# Patient Record
Sex: FEMALE | Race: BLACK OR AFRICAN AMERICAN | NOT HISPANIC OR LATINO | URBAN - METROPOLITAN AREA
[De-identification: names, ages, dates, MRNs, and addresses within clinical notes are randomized per-mention and may not be internally consistent; named-entity substitution may affect disease eponyms.]

---

## 2017-01-22 ENCOUNTER — EMERGENCY (EMERGENCY)
Facility: HOSPITAL | Age: 22
LOS: 0 days | Discharge: HOME | End: 2017-01-22
Attending: EMERGENCY MEDICINE
Payer: COMMERCIAL

## 2017-01-22 VITALS
SYSTOLIC BLOOD PRESSURE: 118 MMHG | RESPIRATION RATE: 18 BRPM | TEMPERATURE: 99 F | HEART RATE: 79 BPM | HEIGHT: 64 IN | OXYGEN SATURATION: 100 % | DIASTOLIC BLOOD PRESSURE: 59 MMHG | WEIGHT: 128.09 LBS

## 2017-01-22 DIAGNOSIS — T78.49XA OTHER ALLERGY, INITIAL ENCOUNTER: ICD-10-CM
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
## 2017-01-22 DIAGNOSIS — T78.40XA ALLERGY, UNSPECIFIED, INITIAL ENCOUNTER: ICD-10-CM

## 2017-01-22 DIAGNOSIS — R21 RASH AND OTHER NONSPECIFIC SKIN ERUPTION: ICD-10-CM

## 2017-01-22 PROCEDURE — 99284 EMERGENCY DEPT VISIT MOD MDM: CPT

## 2017-01-22 RX ORDER — HYDROXYZINE HCL 10 MG
1 TABLET ORAL
Qty: 16 | Refills: 0
Start: 2017-01-22 | End: 2017-01-26

## 2017-01-22 RX ORDER — DIPHENHYDRAMINE HCL 50 MG
25 CAPSULE ORAL ONCE
Qty: 0 | Refills: 0 | Status: COMPLETED | OUTPATIENT
Start: 2017-01-22 | End: 2017-01-22

## 2017-01-22 RX ADMIN — Medication 60 MILLIGRAM(S): at 19:25

## 2017-01-22 RX ADMIN — Medication 25 MILLIGRAM(S): at 19:25

## 2017-01-22 NOTE — ED ADULT NURSE NOTE - OBJECTIVE STATEMENT
AAO X4 , ACCOMPANIED BY MOTHER C/O rash to entire body , itching , hives , stinging sensation , redness ,

## 2017-01-22 NOTE — ED ADULT NURSE NOTE - CAS TRG GENERAL NORM CIRC DET
Bounding peripheral pulses/No visible significant external bleeding/Capillary refill less/equal to 2 seconds/Strong peripheral pulses

## 2018-06-15 ENCOUNTER — TRANSCRIPTION ENCOUNTER (OUTPATIENT)
Age: 23
End: 2018-06-15

## 2019-12-10 ENCOUNTER — EMERGENCY (EMERGENCY)
Facility: HOSPITAL | Age: 24
LOS: 1 days | Discharge: STILL ADMITTED OR ADMINISTRATIVE | End: 2019-12-10
Admitting: EMERGENCY MEDICINE

## 2019-12-10 ENCOUNTER — EMERGENCY (EMERGENCY)
Facility: HOSPITAL | Age: 24
LOS: 0 days | Discharge: ANOTHER HOSPITAL | End: 2019-12-10
Attending: EMERGENCY MEDICINE
Payer: MEDICAID

## 2019-12-10 ENCOUNTER — OUTPATIENT (OUTPATIENT)
Dept: INPATIENT UNIT | Facility: HOSPITAL | Age: 24
LOS: 1 days | Discharge: HOME | End: 2019-12-10
Payer: MEDICAID

## 2019-12-10 VITALS
TEMPERATURE: 98 F | DIASTOLIC BLOOD PRESSURE: 62 MMHG | OXYGEN SATURATION: 100 % | HEART RATE: 75 BPM | SYSTOLIC BLOOD PRESSURE: 123 MMHG | RESPIRATION RATE: 18 BRPM

## 2019-12-10 VITALS
RESPIRATION RATE: 14 BRPM | HEART RATE: 74 BPM | SYSTOLIC BLOOD PRESSURE: 99 MMHG | TEMPERATURE: 99 F | DIASTOLIC BLOOD PRESSURE: 54 MMHG

## 2019-12-10 VITALS
HEART RATE: 106 BPM | OXYGEN SATURATION: 100 % | DIASTOLIC BLOOD PRESSURE: 54 MMHG | TEMPERATURE: 98 F | SYSTOLIC BLOOD PRESSURE: 123 MMHG | HEIGHT: 64 IN | RESPIRATION RATE: 22 BRPM

## 2019-12-10 VITALS
SYSTOLIC BLOOD PRESSURE: 156 MMHG | DIASTOLIC BLOOD PRESSURE: 106 MMHG | WEIGHT: 147.93 LBS | RESPIRATION RATE: 18 BRPM | TEMPERATURE: 98 F | HEIGHT: 64 IN | OXYGEN SATURATION: 100 % | HEART RATE: 88 BPM

## 2019-12-10 VITALS — DIASTOLIC BLOOD PRESSURE: 62 MMHG | SYSTOLIC BLOOD PRESSURE: 111 MMHG

## 2019-12-10 DIAGNOSIS — Z3A.00 WEEKS OF GESTATION OF PREGNANCY NOT SPECIFIED: ICD-10-CM

## 2019-12-10 DIAGNOSIS — R10.9 UNSPECIFIED ABDOMINAL PAIN: ICD-10-CM

## 2019-12-10 DIAGNOSIS — Z3A.23 23 WEEKS GESTATION OF PREGNANCY: ICD-10-CM

## 2019-12-10 DIAGNOSIS — O26.899 OTHER SPECIFIED PREGNANCY RELATED CONDITIONS, UNSPECIFIED TRIMESTER: ICD-10-CM

## 2019-12-10 LAB
APPEARANCE UR: CLEAR — SIGNIFICANT CHANGE UP
BILIRUB UR-MCNC: NEGATIVE — SIGNIFICANT CHANGE UP
BLOOD UR QL VISUAL: NEGATIVE — SIGNIFICANT CHANGE UP
COLOR SPEC: COLORLESS — SIGNIFICANT CHANGE UP
GLUCOSE UR-MCNC: NEGATIVE — SIGNIFICANT CHANGE UP
KETONES UR-MCNC: SIGNIFICANT CHANGE UP
LEUKOCYTE ESTERASE UR-ACNC: NEGATIVE — SIGNIFICANT CHANGE UP
NITRITE UR-MCNC: NEGATIVE — SIGNIFICANT CHANGE UP
PH UR: 8 — SIGNIFICANT CHANGE UP (ref 5–8)
PROT UR-MCNC: NEGATIVE — SIGNIFICANT CHANGE UP
SP GR SPEC: 1.01 — SIGNIFICANT CHANGE UP (ref 1–1.04)
UROBILINOGEN FLD QL: NORMAL — SIGNIFICANT CHANGE UP

## 2019-12-10 PROCEDURE — 99285 EMERGENCY DEPT VISIT HI MDM: CPT

## 2019-12-10 PROCEDURE — 76817 TRANSVAGINAL US OBSTETRIC: CPT | Mod: 26

## 2019-12-10 PROCEDURE — 99205 OFFICE O/P NEW HI 60 MIN: CPT | Mod: 25

## 2019-12-10 PROCEDURE — 76815 OB US LIMITED FETUS(S): CPT | Mod: 26

## 2019-12-10 RX ORDER — ALBUTEROL 90 UG/1
0 AEROSOL, METERED ORAL
Qty: 0 | Refills: 0 | DISCHARGE

## 2019-12-10 NOTE — ED PROVIDER NOTE - NSREASONFORTRANSFER_ED_A_ED
Higher Level of Care or Service Not Available/Pre-existing Relationship/No Available Appropriate Bed at this Facility

## 2019-12-10 NOTE — ED PROVIDER NOTE - OBJECTIVE STATEMENT
here for contractions.  23 weeks pregnant and having contractions q 10 min.    no vaginal bleeding and no discharge    Normal pregnancy so far treated at [facility]

## 2019-12-10 NOTE — OB PROVIDER TRIAGE NOTE - NSOBPROVIDERNOTE_OBGYN_ALL_OB_FT
23yo P0 25yo -American female P0 @ 22.6 wks SLIUP uncomp PNC here from [facility] for eval of PTL  -TVS reassuring and [place] with no ctx's  -UA with trace ketones  -pt was dw Dr Mckenna Powers and Dr Jansen. Pt was cleared for dc home with instructions to follow up with her scheduled appt for Thursday

## 2019-12-10 NOTE — ED PROVIDER NOTE - PHYSICAL EXAMINATION
[place]:  General: No distress.  Mentation at baseline.   HEENT: WNL  Chest/Lungs: CTAB, No wheeze, No retractions, No increased work of breathing, Normal rate  Heart: S1S2 RRR, No M/R/G, Pules equal Bilaterally in upper and lower extremities distally  Abd: soft, NT/ND, No guarding, No rebound.  No hernias, no palpable masses. gravid.  US shows 155 FHR  Extrem: FROM in all joints, no significant edema noted, No ulcers.  Cap refil < 2sec.  Skin: No rash noted, warm dry.  Neuro:  Grossly normal.  No difficulty ambulating. No focal deficits.  Psychiatric: No evidence of delusions. No SI/HI.

## 2019-12-10 NOTE — ED ADULT NURSE NOTE - NS_SISCREENINGSR_GEN_ALL_ED
Problem: Patient Care Overview  Goal: Plan of Care Review  Patient to be discharged home today with home. Patient in agreement with plan.Reviewed AVS with patient/nephew with understanding stated. Discharged via wheelchair with nephew at side in stable condition; belongings with patient.        Negative

## 2019-12-10 NOTE — ED ADULT TRIAGE NOTE - CHIEF COMPLAINT QUOTE
severe intermittent lower abdominal cramping pains x2 hours prior to ed visit. 6 months pregnant with LMP 7/819

## 2019-12-10 NOTE — OB PROVIDER TRIAGE NOTE - NSHPPHYSICALEXAM_GEN_ALL_CORE
Gen: A&O x 3; NAD  Vital signs: BP-99/54; P-74; T-37    Abd exam-soft and nontender; gravid    SSE-os appears closed  TVS-3.37-3.88 cm no funnel/dynamic change Gen: A&O x 3; NAD  Vital signs: BP-99/54; P-74; T-37    Abd exam-soft and nontender; gravid    SSE-os appears closed  TVS-3.37-3.88 cm no funnel/dynamic change    TAS-vtx; anterior placenta; JONATHON-14.45; 506g; BPP 8/8

## 2019-12-10 NOTE — ED ADULT NURSE NOTE - OBJECTIVE STATEMENT
pt A&Ox4 with c/o severe intermittent lower abdominal cramping pains x2 hours prior to ed visit. 6 months pregnant with LMP 7/819. PMH Asthma pt A&Ox4 with c/o severe intermittent lower abdominal cramping pains x2 hours prior to ed visit. 6 months pregnant with LMP . PMH Asthma(  was last asthma attack) pt was pregnant one time before and had an . pt received prenatal care at Dr. Cano ( [facility] ) No complications prior to today. pt A&Ox4 with c/o severe intermittent lower abdominal cramping pains x2 hours prior to ed visit. pt is 23 weeks pregnant with LMP . PMH Asthma(  was last asthma attack) pt was pregnant one time before and had an . pt received prenatal care at Dr. Cano ( [facility] ) No complications prior to today.

## 2019-12-10 NOTE — OB PROVIDER TRIAGE NOTE - HISTORY OF PRESENT ILLNESS
25yo -American female at 22.6 wks SLIUP uncomp PNC transferred from [facility] for eval of pt co abd lower abd cramping that started this morning. Pt reports GFM and denies VB/LOF/changes in her vag discharge. Pt does report urinary frequency but denies hematuria or dysuria. No fibroids.    Pmhx-asthma-last attack 2 wks ago  Pshx/Hosp-denies  Meds-PNV  Past ob-denies  Gyn-denies

## 2020-01-27 PROBLEM — Z00.00 ENCOUNTER FOR PREVENTIVE HEALTH EXAMINATION: Status: ACTIVE | Noted: 2020-01-27

## 2020-01-28 PROBLEM — J45.909 UNSPECIFIED ASTHMA, UNCOMPLICATED: Chronic | Status: ACTIVE | Noted: 2019-12-10

## 2020-02-11 ENCOUNTER — OUTPATIENT (OUTPATIENT)
Dept: OUTPATIENT SERVICES | Facility: HOSPITAL | Age: 25
LOS: 1 days | Discharge: HOME | End: 2020-02-11

## 2020-02-11 DIAGNOSIS — D64.9 ANEMIA, UNSPECIFIED: ICD-10-CM

## 2020-02-13 ENCOUNTER — RESULT REVIEW (OUTPATIENT)
Age: 25
End: 2020-02-13

## 2020-02-13 ENCOUNTER — APPOINTMENT (OUTPATIENT)
Dept: HEMATOLOGY ONCOLOGY | Facility: CLINIC | Age: 25
End: 2020-02-13
Payer: MEDICAID

## 2020-02-13 VITALS
SYSTOLIC BLOOD PRESSURE: 109 MMHG | HEART RATE: 94 BPM | DIASTOLIC BLOOD PRESSURE: 67 MMHG | WEIGHT: 153.88 LBS | HEIGHT: 65.04 IN | BODY MASS INDEX: 25.64 KG/M2 | RESPIRATION RATE: 16 BRPM | TEMPERATURE: 98.2 F | OXYGEN SATURATION: 100 %

## 2020-02-13 DIAGNOSIS — O99.019 ANEMIA COMPLICATING PREGNANCY, UNSPECIFIED TRIMESTER: ICD-10-CM

## 2020-02-13 DIAGNOSIS — Z87.09 PERSONAL HISTORY OF OTHER DISEASES OF THE RESPIRATORY SYSTEM: ICD-10-CM

## 2020-02-13 DIAGNOSIS — D50.9 ANEMIA COMPLICATING PREGNANCY, UNSPECIFIED TRIMESTER: ICD-10-CM

## 2020-02-13 DIAGNOSIS — O21.0 MILD HYPEREMESIS GRAVIDARUM: ICD-10-CM

## 2020-02-13 LAB
BASOPHILS # BLD AUTO: 0 K/UL — SIGNIFICANT CHANGE UP (ref 0–0.2)
BASOPHILS NFR BLD AUTO: 0.3 % — SIGNIFICANT CHANGE UP (ref 0–2)
EOSINOPHIL # BLD AUTO: 0.1 K/UL — SIGNIFICANT CHANGE UP (ref 0–0.5)
EOSINOPHIL NFR BLD AUTO: 0.9 % — SIGNIFICANT CHANGE UP (ref 0–6)
FERRITIN SERPL-MCNC: 7 NG/ML
FOLATE SERPL-MCNC: 7.7 NG/ML
HCT VFR BLD CALC: 28 % — LOW (ref 34.5–45)
HGB BLD-MCNC: 9.6 G/DL — LOW (ref 11.5–15.5)
IRON SATN MFR SERPL: 22 %
IRON SERPL-MCNC: 102 UG/DL
LYMPHOCYTES # BLD AUTO: 1.2 K/UL — SIGNIFICANT CHANGE UP (ref 1–3.3)
LYMPHOCYTES # BLD AUTO: 12.1 % — LOW (ref 13–44)
MCHC RBC-ENTMCNC: 30 PG — SIGNIFICANT CHANGE UP (ref 27–34)
MCHC RBC-ENTMCNC: 34.5 G/DL — SIGNIFICANT CHANGE UP (ref 32–36)
MCV RBC AUTO: 86.9 FL — SIGNIFICANT CHANGE UP (ref 80–100)
MONOCYTES # BLD AUTO: 1 K/UL — HIGH (ref 0–0.9)
MONOCYTES NFR BLD AUTO: 9.8 % — SIGNIFICANT CHANGE UP (ref 2–14)
NEUTROPHILS # BLD AUTO: 7.6 K/UL — HIGH (ref 1.8–7.4)
NEUTROPHILS NFR BLD AUTO: 76.9 % — SIGNIFICANT CHANGE UP (ref 43–77)
PLATELET # BLD AUTO: 157 K/UL — SIGNIFICANT CHANGE UP (ref 150–400)
RBC # BLD: 3.22 M/UL — LOW (ref 3.8–5.2)
RBC # FLD: 12.2 % — SIGNIFICANT CHANGE UP (ref 10.3–14.5)
TIBC SERPL-MCNC: 456 UG/DL
UIBC SERPL-MCNC: 354 UG/DL
VIT B12 SERPL-MCNC: 291 PG/ML
WBC # BLD: 9.8 K/UL — SIGNIFICANT CHANGE UP (ref 3.8–10.5)
WBC # FLD AUTO: 9.8 K/UL — SIGNIFICANT CHANGE UP (ref 3.8–10.5)

## 2020-02-13 PROCEDURE — 99205 OFFICE O/P NEW HI 60 MIN: CPT

## 2020-02-13 RX ORDER — ALBUTEROL 90 MCG
90 AEROSOL (GRAM) INHALATION
Refills: 0 | Status: ACTIVE | COMMUNITY

## 2020-02-13 RX ORDER — PRENATAL VIT 49/IRON FUM/FOLIC 6.75-0.2MG
TABLET ORAL
Refills: 0 | Status: ACTIVE | COMMUNITY

## 2020-02-13 RX ORDER — GLUC/MSM/COLGN2/HYAL/ANTIARTH3 375-375-20
TABLET ORAL
Refills: 0 | Status: ACTIVE | COMMUNITY

## 2020-02-14 NOTE — HISTORY OF PRESENT ILLNESS
[Disease:__________________________] : Disease: [unfilled] [de-identified] : moderate [de-identified] : 25 yo female with iron def anemia, currently 32 weeks pregnant with her first child. She has had problems with heyperemesis of pregnancy resulting in difficulty keeping po iron down. She reports the past month she has found an OTC iron tab which she is able to tolerate. She is otherwise doing well with the pregnancy. She was referred by her Ob for consideration of IV iron. She has never required IV iron in the past and never had blood transfusions. She is accompanied today with her boyfriend/father of the child. [Treatment Protocol] : Treatment Protocol [FreeTextEntry1] : po iron tabs

## 2020-02-14 NOTE — PHYSICAL EXAM
[Restricted in physically strenuous activity but ambulatory and able to carry out work of a light or sedentary nature] : Status 1- Restricted in physically strenuous activity but ambulatory and able to carry out work of a light or sedentary nature, e.g., light house work, office work [de-identified] : gravid abdomen + fetal movement [Normal] : affect appropriate

## 2020-05-12 ENCOUNTER — OUTPATIENT (OUTPATIENT)
Dept: OUTPATIENT SERVICES | Facility: HOSPITAL | Age: 25
LOS: 1 days | Discharge: HOME | End: 2020-05-12

## 2020-05-12 ENCOUNTER — APPOINTMENT (OUTPATIENT)
Dept: HEMATOLOGY ONCOLOGY | Facility: CLINIC | Age: 25
End: 2020-05-12

## 2020-05-12 DIAGNOSIS — D64.9 ANEMIA, UNSPECIFIED: ICD-10-CM

## 2020-12-06 ENCOUNTER — TRANSCRIPTION ENCOUNTER (OUTPATIENT)
Age: 25
End: 2020-12-06

## 2021-06-17 NOTE — ED PROVIDER NOTE - DISPOSITION TYPE
TRANSFER